# Patient Record
Sex: MALE | Race: BLACK OR AFRICAN AMERICAN | Employment: FULL TIME | URBAN - METROPOLITAN AREA
[De-identification: names, ages, dates, MRNs, and addresses within clinical notes are randomized per-mention and may not be internally consistent; named-entity substitution may affect disease eponyms.]

---

## 2019-08-21 ENCOUNTER — APPOINTMENT (OUTPATIENT)
Dept: GENERAL RADIOLOGY | Age: 34
End: 2019-08-21
Payer: MEDICARE

## 2019-08-21 ENCOUNTER — HOSPITAL ENCOUNTER (EMERGENCY)
Age: 34
Discharge: HOME | End: 2019-08-21
Payer: MEDICARE

## 2019-08-21 VITALS
BODY MASS INDEX: 27.49 KG/M2 | RESPIRATION RATE: 16 BRPM | TEMPERATURE: 98.4 F | HEIGHT: 66 IN | WEIGHT: 171.08 LBS | OXYGEN SATURATION: 98 % | HEART RATE: 94 BPM | SYSTOLIC BLOOD PRESSURE: 115 MMHG | DIASTOLIC BLOOD PRESSURE: 69 MMHG

## 2019-08-21 DIAGNOSIS — L98.9 LESION OF SKIN OF SCALP: ICD-10-CM

## 2019-08-21 DIAGNOSIS — M25.572 ACUTE LEFT ANKLE PAIN: Primary | ICD-10-CM

## 2019-08-21 PROCEDURE — 73610 X-RAY EXAM OF ANKLE: CPT

## 2019-08-21 PROCEDURE — 99283 EMERGENCY DEPT VISIT LOW MDM: CPT

## 2019-08-21 RX ORDER — CEPHALEXIN 500 MG/1
500 CAPSULE ORAL 4 TIMES DAILY
Qty: 40 CAPSULE | Refills: 0 | Status: SHIPPED | OUTPATIENT
Start: 2019-08-21 | End: 2021-04-26

## 2019-08-21 RX ORDER — IBUPROFEN 600 MG/1
600 TABLET ORAL EVERY 6 HOURS PRN
Qty: 20 TABLET | Refills: 0 | Status: SHIPPED | OUTPATIENT
Start: 2019-08-21 | End: 2022-05-02

## 2019-08-21 ASSESSMENT — PAIN DESCRIPTION - PROGRESSION: CLINICAL_PROGRESSION: GRADUALLY WORSENING

## 2019-08-21 ASSESSMENT — PAIN DESCRIPTION - DESCRIPTORS: DESCRIPTORS: ACHING

## 2019-08-21 ASSESSMENT — PAIN DESCRIPTION - LOCATION: LOCATION: HEAD;FOOT

## 2019-08-21 ASSESSMENT — PAIN DESCRIPTION - PAIN TYPE: TYPE: ACUTE PAIN

## 2019-08-21 ASSESSMENT — PAIN SCALES - GENERAL
PAINLEVEL_OUTOF10: 6
PAINLEVEL_OUTOF10: 6
PAINLEVEL_OUTOF10: 8

## 2019-08-21 ASSESSMENT — PAIN - FUNCTIONAL ASSESSMENT: PAIN_FUNCTIONAL_ASSESSMENT: PREVENTS OR INTERFERES WITH MANY ACTIVE NOT PASSIVE ACTIVITIES

## 2019-08-21 ASSESSMENT — PAIN DESCRIPTION - FREQUENCY: FREQUENCY: CONTINUOUS

## 2019-08-21 NOTE — ED NOTES
Dc'd to home  Awake alert  resp easy and unlabored  Walked out with Mom  To return for worsening or changes  Aware to wash hair and then apply the antibiotic ointment  Given  follow up to get a [address] [place] [address] Manuel Becker RN  08/21/19 8024

## 2019-08-21 NOTE — ED PROVIDER NOTES
[address] [address] [address] [facility] [address]  Dept: [phone]  Loc: [address] ENCOUNTER        This patient was not seen or evaluated by the attending physician. I evaluated this patient, the attending physician was available for consultation. CHIEF COMPLAINT    Chief Complaint   Patient presents with    Leg Pain     lower leg and in heel    Abscess     in head       HPI    Yoni Rodriguez is a 30 y.o. male who presents with pain localized in the left ankle. Onset was 1 month ago. The context was a spontaneous onset, patient cannot recall specific injury. The pain severity is 8/10. The patient has no other associated injuries. The pain is aggravated by ambulation. Patient also complains of rash of his scalp for the past 2 weeks. REVIEW OF SYSTEMS    General: No fever  Skin: see HPI, No lacerations or puncture wounds  Musculoskeletal: see HPI, no other joint pain    PAST MEDICAL & SURGICAL HISTORY    Past Medical History:   Diagnosis Date    Asthma      History reviewed. No pertinent surgical history.     CURRENT MEDICATIONS  (may include discharge medications prescribed in the ED)      ALLERGIES    Allergies   Allergen Reactions    Bactrim [Sulfamethoxazole-Trimethoprim] Nausea And Vomiting       SOCIAL & FAMILY HISTORY    Social History     Socioeconomic History    Marital status: Single     Spouse name: None    Number of children: None    Years of education: None    Highest education level: None   Occupational History    None   Social Needs    Financial resource strain: None    Food insecurity:     Worry: None     Inability: None    Transportation needs:     Medical: None     Non-medical: None   Tobacco Use    Smoking status: Current Every Day Smoker    Smokeless tobacco: Never Used   Substance and Sexual Activity    Alcohol use: None    Drug use: None    Sexual activity: None injury, tendon injury, other    No evidence of neurovascular injury on exam.  Plain films as above. Patient's scalp rash has the appearance of impetigo. I will prescribe Keflex and Bactroban. I instructed the patient to follow up as an outpatient in 2 days. I instructed the patient to return to the ED immediately for any new or worsening symptoms. The patient verbalizes understanding. FINAL IMPRESSION    1. Acute left ankle pain    2.  Lesion of skin of scalp        PLAN  Discharge with outpatient follow-up (see EMR)    (Please note that this note was completed with a voice recognition program.  Every attempt was made to edit the dictations, but inevitably there remain words that are mis-transcribed.)            Vanesa Almeida, [place]  08/21/19 8250

## 2021-04-26 ENCOUNTER — HOSPITAL ENCOUNTER (EMERGENCY)
Age: 36
Discharge: HOME | End: 2021-04-26

## 2021-04-26 VITALS
WEIGHT: 153 LBS | SYSTOLIC BLOOD PRESSURE: 122 MMHG | RESPIRATION RATE: 22 BRPM | DIASTOLIC BLOOD PRESSURE: 78 MMHG | BODY MASS INDEX: 24.69 KG/M2 | TEMPERATURE: 98.3 F | HEART RATE: 70 BPM | OXYGEN SATURATION: 97 %

## 2021-04-26 DIAGNOSIS — R21 RASH AND OTHER NONSPECIFIC SKIN ERUPTION: Primary | ICD-10-CM

## 2021-04-26 PROCEDURE — 99283 EMERGENCY DEPT VISIT LOW MDM: CPT

## 2021-04-26 RX ORDER — METHYLPREDNISOLONE 4 MG/1
TABLET ORAL
Qty: 1 KIT | Refills: 0 | Status: SHIPPED | OUTPATIENT
Start: 2021-04-26 | End: 2021-05-02

## 2021-04-26 RX ORDER — DIPHENHYDRAMINE HCL 25 MG
25 CAPSULE ORAL 3 TIMES DAILY PRN
Qty: 20 CAPSULE | Refills: 0 | Status: SHIPPED | OUTPATIENT
Start: 2021-04-26 | End: 2021-05-06

## 2021-04-26 RX ORDER — DIAPER,BRIEF,INFANT-TODD,DISP
EACH MISCELLANEOUS PRN
COMMUNITY
End: 2022-05-02

## 2021-04-26 RX ORDER — FAMOTIDINE 20 MG/1
20 TABLET, FILM COATED ORAL 2 TIMES DAILY
Qty: 28 TABLET | Refills: 0 | Status: SHIPPED | OUTPATIENT
Start: 2021-04-26 | End: 2022-05-02

## 2021-04-26 ASSESSMENT — ENCOUNTER SYMPTOMS
BACK PAIN: 0
DIARRHEA: 0
NAUSEA: 0
VOMITING: 0
EYE PAIN: 0
SHORTNESS OF BREATH: 0
ABDOMINAL PAIN: 0
COUGH: 0

## 2021-04-26 NOTE — ED NOTES
Pt /c home with AVS no s/s of distress noted script x 3 he denies questions about f/u care no ss of distress at d.c he denies pain      Christal Miranda RN  04/26/21 0931

## 2021-04-26 NOTE — ED NOTES
Report given to Chris Gongora RN at this time, all questions answered. Denies any further questions at this time.       Halina Delatorre RN  04/26/21 1568

## 2021-04-26 NOTE — ED PROVIDER NOTES
[address] [facility] ENCOUNTER        Pt Name: Kassidy Velázquez  MRN: [id number]  [place] 1985  Date of evaluation: 4/26/2021  Provider: LEXI Alcantara  PCP: No primary care provider on file. DEWAYNE. I have evaluated this patient. My supervising physician was available for consultation. CHIEF COMPLAINT       Chief Complaint   Patient presents with    Rash     bilateral forearm, back of head, left thigh. Patient denies new detergents, denies new soaps. Patient states he ate a piece of fish around 4 days ago, states his left arm started itching the next morning. hx of exzema, states this is different. HISTORY OF PRESENT ILLNESS   (Location, Timing/Onset, Context/Setting, Quality, Duration, Modifying Factors, Severity, Associated Signs and Symptoms)  Note limiting factors. Kassidy Velázquez is a 37 y.o. male who presents to the ED for evaluation of pruritic rash. She reports dry scaly skin with raised erythematous rash to bilateral arms, left side of the neck, left medial thigh. Onset over the past 4 days. He reports he does have a history of eczema that he uses hydrocortisone ointment for, but he does not feel that this has been helping with his symptoms. This feels much worse than previous eczema. He feels like this started after he ate fish from a restaurant 4 days ago. He denies swelling of the lips or the tongue. No difficulty breathing or swallowing. No nausea vomiting or near syncope. Has not tried any other medications. Skin feels very dry. No other acute concerns, associated symptoms or modifying factors. Nursing Notes were all reviewed and agreed with or any disagreements were addressed in the HPI. REVIEW OF SYSTEMS    (2-9 systems for level 4, 10 or more for level 5)     Review of Systems   Constitutional: Negative for chills, fatigue and fever. Eyes: Negative for pain.    Respiratory: Negative for cough and Eyes:      General:         Right eye: No discharge. Left eye: No discharge. Conjunctiva/sclera: Conjunctivae normal.   Neck:      Musculoskeletal: Normal range of motion and neck supple. Pulmonary:      Effort: Pulmonary effort is normal. No respiratory distress. Breath sounds: No stridor. Musculoskeletal: Normal range of motion. General: No swelling. Skin:     General: Skin is warm and dry. Coloration: Skin is not pale. Findings: Rash present. No abrasion, abscess, acne, bruising, burn, ecchymosis, signs of injury, laceration, lesion, petechiae or wound. Rash is papular, scaling and urticarial. Rash is not crusting, macular, nodular, purpuric, pustular or vesicular. Comments: With very scaly dry excoriated skin to bilateral forearms with slightly erythematous papular lesions scattered throughout. Lesions on the neck do appear slightly more urticarial on the left posterior aspect of the neck. Neurological:      Mental Status: He is alert and oriented to person, place, and time. Comments: No gross facial drooping. Moves all 4 extremities spontaneously. Psychiatric:         Behavior: Behavior normal.         DIAGNOSTIC RESULTS   LABS:    Labs Reviewed - No data to display    All other labs were within normal range or not returned as of this dictation. EKG: All EKG's are interpreted by the Emergency Department Physician in the absence of a cardiologist.  Please see their note for interpretation of EKG. RADIOLOGY:   Non-plain film images such as CT, Ultrasound and MRI are read by the radiologist. Plain radiographic images are visualized and preliminarily interpreted by the ED Provider with the below findings:        Interpretation per the Radiologist below, if available at the time of this note:    No orders to display     No results found.         PROCEDURES   Unless otherwise noted below, none     Procedures    CRITICAL CARE TIME N/A    CONSULTS:  None      EMERGENCY DEPARTMENT COURSE and DIFFERENTIAL DIAGNOSIS/MDM:   Vitals:    Vitals:    04/26/21 1728   BP: 122/78   Pulse: 70   Resp: 22   Temp: 98.3 °F (36.8 °C)   TempSrc: Oral   SpO2: 97%   Weight: 153 lb (69.4 kg)       Patient was given the following medications:  Medications - No data to display        DDX: Vasculitis, bacterial skin infection, viral rash, systemic infectious rash, Anaphylaxis, Urticaria. Patient seen and evaluated as detailed above for pruritic rash. Patient states his seem to worsen after a he ate fish from a restaurant patient is afebrile, in no acute distress, and nontoxic in appearance with unremarkable vital signs. No mucous membrane involvement with low suspicion for SJS/TEN. No wheezing or difficulty breathing with low suspicion for systemic involvement. It is very scaly, dry, with erythematous papular lesions on his arms, slightly more urticarial lesions on the neck. Will place on Medrol Dosepak, Benadryl, Pepcid, recommended Aquaphor for increased moisturizing as the patient has very dry skin. I wonder if this could be underlying eczema however will treat for possible allergic dermatitis with the aforementioned medications. Amended follow-up with PCP. At this time I believe patient's presentation does not warrant further workup with labs or imaging in the emergency department and is stable for discharge home. Discussed close monitoring for progression. Patient will be discharged with instructions to follow up with the primary care physician for reevaluation in the next few days, and to return to the emergency department for any worsening symptoms or further concerns. They verbalized understanding and were discharged in stable condition. David Gonzalez is well appearing, non-toxic, and afebrile at the time of discharge. FINAL IMPRESSION      1.  Rash and other nonspecific skin eruption          DISPOSITION/PLAN   DISPOSITION Decision To Discharge 04/26/2021 06:03:43 PM      PATIENT REFERREDTO:  Albino Mae  [phone]  Schedule an appointment as soon as possible for a visit   establish primary care with FlorSusan LE[place]ang [address] [address]  [phone]    If symptoms worsen      DISCHARGE MEDICATIONS:  New Prescriptions    DIPHENHYDRAMINE (BENADRYL) 25 MG CAPSULE    Take 1 capsule by mouth 3 times daily as needed for Itching    FAMOTIDINE (PEPCID) 20 MG TABLET    Take 1 tablet by mouth 2 times daily    METHYLPREDNISOLONE (MEDROL, BEVERLY,) 4 MG TABLET    Take by mouth. DISCONTINUED MEDICATIONS:  Discontinued Medications    CEPHALEXIN (KEFLEX) 500 MG CAPSULE    Take 1 capsule by mouth 4 times daily    MUPIROCIN (BACTROBAN) 2 % OINTMENT    Apply topically 2 times daily. 30 gram tube. Generic OK.               (Please note that portions of this note were completed with a voice recognition program.  Efforts were made to edit the dictations but occasionally words are mis-transcribed.)    LEXI Solo (electronically signed)            LEXI Solo  04/26/21 3121

## 2021-05-08 ENCOUNTER — APPOINTMENT (OUTPATIENT)
Dept: CT IMAGING | Age: 36
End: 2021-05-08

## 2021-05-08 ENCOUNTER — HOSPITAL ENCOUNTER (EMERGENCY)
Age: 36
Discharge: HOME | End: 2021-05-08
Attending: EMERGENCY MEDICINE

## 2021-05-08 VITALS
WEIGHT: 151.24 LBS | HEART RATE: 80 BPM | TEMPERATURE: 98.3 F | BODY MASS INDEX: 24.41 KG/M2 | SYSTOLIC BLOOD PRESSURE: 144 MMHG | DIASTOLIC BLOOD PRESSURE: 100 MMHG | RESPIRATION RATE: 16 BRPM | OXYGEN SATURATION: 100 %

## 2021-05-08 DIAGNOSIS — L30.9 DERMATITIS: ICD-10-CM

## 2021-05-08 DIAGNOSIS — S09.90XA INJURY OF HEAD, INITIAL ENCOUNTER: Primary | ICD-10-CM

## 2021-05-08 PROCEDURE — 99283 EMERGENCY DEPT VISIT LOW MDM: CPT

## 2021-05-08 PROCEDURE — 70450 CT HEAD/BRAIN W/O DYE: CPT

## 2021-05-08 RX ORDER — DIAPER,BRIEF,INFANT-TODD,DISP
EACH MISCELLANEOUS
Qty: 1 TUBE | Refills: 1 | Status: SHIPPED | OUTPATIENT
Start: 2021-05-08 | End: 2021-05-15

## 2021-05-08 RX ORDER — DIPHENHYDRAMINE HCL 25 MG
25 CAPSULE ORAL EVERY 4 HOURS PRN
Qty: 1 CAPSULE | Refills: 0 | Status: SHIPPED | OUTPATIENT
Start: 2021-05-08 | End: 2021-05-18

## 2021-05-08 ASSESSMENT — PAIN - FUNCTIONAL ASSESSMENT: PAIN_FUNCTIONAL_ASSESSMENT: 0-10

## 2021-05-08 ASSESSMENT — PAIN DESCRIPTION - LOCATION: LOCATION: NECK

## 2021-05-08 ASSESSMENT — PAIN DESCRIPTION - DESCRIPTORS: DESCRIPTORS: BURNING;ITCHING

## 2021-05-09 NOTE — ED NOTES
Return to room     Asael Early, [facility]  05/08/21 5577

## 2021-05-09 NOTE — ED NOTES
Patient remains in pain, states, \"this medicine will help\" EMD aware, reviewed instructions with patient, verb under, discharged home     Arturo Banks [facility]  05/08/21 5733

## 2021-05-09 NOTE — ED PROVIDER NOTES
sore throat. Rash to posterior left neck. Respiratory: No cough, No shortness of breath, No sputum production. Cardiovascular: No chest pain. No palpitations. Gastrointestinal: No abdominal pain. No nausea or vomiting  Hematology/Lymphatics: No bleeding or bruising tendency. Immunologic: No malaise. No swollen glands. Musculoskeletal: No back pain. No joint pain. Integumentary: No rash. No abrasions. Hematoma to top of head. Neurologic: No headache. No focal numbness or weakness. PAST MEDICAL HISTORY     Past Medical History:   Diagnosis Date    Asthma     Eczema          SURGICALHISTORY     History reviewed. No pertinent surgical history. CURRENT MEDICATIONS       Discharge Medication List as of 5/8/2021 10:42 PM      CONTINUE these medications which have NOT CHANGED    Details   !! hydrocortisone 1 % cream Apply topically as needed For eczema, Topical, PRN, Historical Med      famotidine (PEPCID) 20 MG tablet Take 1 tablet by mouth 2 times daily, Disp-28 tablet, R-0Print      ibuprofen (ADVIL;MOTRIN) 600 MG tablet Take 1 tablet by mouth every 6 hours as needed for Pain, Disp-20 tablet, R-0Print       !! - Potential duplicate medications found. Please discuss with provider. ALLERGIES     Bactrim [sulfamethoxazole-trimethoprim]    FAMILY HISTORY     History reviewed. No pertinent family history.        SOCIAL HISTORY       Social History     Socioeconomic History    Marital status: Single     Spouse name: None    Number of children: None    Years of education: None    Highest education level: None   Occupational History    None   Social Needs    Financial resource strain: None    Food insecurity     Worry: None     Inability: None    Transportation needs     Medical: None     Non-medical: None   Tobacco Use    Smoking status: Current Every Day Smoker     Packs/day: 2.00     Types: Cigars    Smokeless tobacco: Never Used    Tobacco comment: Black and mild FT   Substance and radiologist. [facility] radiographic images are visualized and preliminarily interpreted by the emergency physician with the below findings:      Interpretation per the Radiologist below, if available at the time ofthis note:    CT Head WO Contrast   Final Result   No acute intracranial abnormality. Specifically, no acute intracranial   hemorrhage or mass effect. Nonspecific bilateral periventricular and deep white matter ill-defined   hypoattenuation. This could be further evaluated with brain MRI as warranted. ED BEDSIDE ULTRASOUND:   Performed by ED Physician - none    LABS:  Labs Reviewed - No data to display        EMERGENCY DEPARTMENT COURSE and DIFFERENTIAL DIAGNOSIS/MDM:   Vitals:    Vitals:    05/08/21 2122   BP: (!) 144/100   Pulse: 80   Resp: 16   Temp: 98.3 °F (36.8 °C)   TempSrc: Oral   SpO2: 100%   Weight: 151 lb 3.8 oz (68.6 kg)         Medical decision making: This is a 82-year-old male who presents for evaluation of a head injury as well as a rash to the left posterior neck. On exam, patient is well-appearing, afebrile, with normal vital signs. He is noted to have a small hematoma and abrasion to the right parietal area of his scalp. Normal neurologic exam, no midline neck pain. There is a maculopapular rash to the left posterior neck. The neck rash appears like possibly eczema or some other dermatitis. He had a similar rash previously treated with steroids and Benadryl, therefore prescribed topical steroid and Benadryl as needed for itching. We did obtain CT imaging of the head which is negative for acute intracranial abnormality. There is nonspecific bilateral periventricular and deep white matter ill-defined hypoattenuation of which the patient is informed and recommended to follow-up closely with his primary care provider for outpatient brain MRI. Recommended patient return with any new or worsening symptoms, otherwise follow closely with PCP.   Patient is understanding and agreeable plan of care, discharged in stable condition. CRITICAL CARE TIME   Total Critical Care time was 0 minutes, excluding separately reportable procedures. There was a high probability of clinically significant/life threatening deterioration in the patient's condition which required my urgent intervention. CONSULTS:  None    PROCEDURES:  Unless otherwise noted below, none         FINAL IMPRESSION      1. Injury of head, initial encounter    2. Dermatitis          DISPOSITION/PLAN   DISPOSITION Decision To Discharge 05/08/2021 10:27:48 PM      PATIENT REFERRED TO:  Albino Mae  [phone]  Schedule an appointment as soon as possible for a visit in 2 days        DISCHARGE MEDICATIONS:  Discharge Medication List as of 5/8/2021 10:42 PM      START taking these medications    Details   !! hydrocortisone 1 % cream Apply topically 2 -3 times daily for 5 - 7 days. , Disp-1 Tube, R-1, Print      diphenhydrAMINE (BENADRYL) 25 MG capsule Take 1 capsule by mouth every 4 hours as needed for Itching, Disp-1 capsule, R-0Print       !! - Potential duplicate medications found. Please discuss with provider.              (Please note that portions of this note were completed with a voice recognition program.Efforts were made to edit the dictations but occasionally words are mis-transcribed.)    Karlo Dominguez MD (electronically signed)  Attending Emergency Physician        Karlo Dominguez MD  05/09/21 1186

## 2021-08-06 ENCOUNTER — HOSPITAL ENCOUNTER (EMERGENCY)
Age: 36
Discharge: HOME | End: 2021-08-06
Attending: EMERGENCY MEDICINE

## 2021-08-06 VITALS
HEART RATE: 93 BPM | WEIGHT: 170.42 LBS | SYSTOLIC BLOOD PRESSURE: 129 MMHG | OXYGEN SATURATION: 99 % | RESPIRATION RATE: 18 BRPM | HEIGHT: 66 IN | BODY MASS INDEX: 27.39 KG/M2 | TEMPERATURE: 97.5 F | DIASTOLIC BLOOD PRESSURE: 84 MMHG

## 2021-08-06 DIAGNOSIS — Z20.2 STD EXPOSURE: Primary | ICD-10-CM

## 2021-08-06 PROCEDURE — 99283 EMERGENCY DEPT VISIT LOW MDM: CPT

## 2021-08-06 PROCEDURE — 6370000000 HC RX 637 (ALT 250 FOR IP): Performed by: EMERGENCY MEDICINE

## 2021-08-06 RX ORDER — METRONIDAZOLE 500 MG/1
2000 TABLET ORAL ONCE
Status: COMPLETED | OUTPATIENT
Start: 2021-08-06 | End: 2021-08-06

## 2021-08-06 RX ADMIN — METRONIDAZOLE 2000 MG: 500 TABLET ORAL at 01:12

## 2021-08-06 ASSESSMENT — PAIN SCALES - GENERAL: PAINLEVEL_OUTOF10: 0

## 2021-08-06 NOTE — ED TRIAGE NOTES
Patient presents to ED complaining of exposure to STD. Denies symptoms, states he was told he was exposed to trichomonas. Patient resting on bed, respirations even and easy at this time. No obvious distress.

## 2021-08-06 NOTE — ED NOTES
Report given to [facility], RN at this time, all questions answered. Denies any further questions at this time. No further patient contact.        Ondina Damico RN  08/06/21 9181

## 2021-08-06 NOTE — ED PROVIDER NOTES
[address] [facility] ENCOUNTER      Pt Name: Armin Interiano  MRN: [id number]  [place] 1985  Date of evaluation: 8/6/2021  Provider: Lonny John MD    CHIEF COMPLAINT       Chief Complaint   Patient presents with    Exposure to STD     Denies symptoms, states he was told he was exposed to trichomonas. HISTORY OF PRESENT ILLNESS   (Location/Symptom, Timing/Onset,Context/Setting, Quality, Duration, Modifying Factors, Severity)  Note limiting factors. Armin Interiano is a 31 y.o. male who presents to the emergency department for STD treatment. The patient states that his significant other explained to him that she tested positive for trichomonas and he would like to be treated. He denies any symptoms. He refuses treatment for other common STDs. Nursing notes were reviewed. REVIEW OF SYSTEMS    (2-9 systems for level 4, 10 or more for level 5)     Review of Systems      PAST MEDICAL HISTORY     Past Medical History:   Diagnosis Date    Asthma     Eczema          SURGICALHISTORY     History reviewed. No pertinent surgical history. CURRENT MEDICATIONS       Discharge Medication List as of 8/6/2021  1:10 AM      CONTINUE these medications which have NOT CHANGED    Details   hydrocortisone 1 % cream Apply topically as needed For eczema, Topical, PRN, Historical Med      famotidine (PEPCID) 20 MG tablet Take 1 tablet by mouth 2 times daily, Disp-28 tablet, R-0Print      ibuprofen (ADVIL;MOTRIN) 600 MG tablet Take 1 tablet by mouth every 6 hours as needed for Pain, Disp-20 tablet, R-0Print             ALLERGIES     Bactrim [sulfamethoxazole-trimethoprim]    FAMILY HISTORY     History reviewed. No pertinent family history.        SOCIAL HISTORY       Social History     Socioeconomic History    Marital status: Single     Spouse name: None    Number of children: None    Years of education: None    Highest education level: None   Occupational History    None   Tobacco Use    Smoking status: Current Every Day Smoker     Packs/day: 2.00     Types: Cigars    Smokeless tobacco: Never Used    Tobacco comment: Black and mild FT   Vaping Use    Vaping Use: Never used   Substance and Sexual Activity    Alcohol use: Yes     Comment: occ    Drug use: Never    Sexual activity: None   Other Topics Concern    None   Social History Narrative    None     Social Determinants of Health     Financial Resource Strain:     Difficulty of Paying Living Expenses:    Food Insecurity:     Worried About Running Out of Food in the Last Year:     Ran Out of Food in the Last Year:    Transportation Needs:     Lack of Transportation (Medical):  Lack of Transportation (Non-Medical):    Physical Activity:     Days of Exercise per Week:     Minutes of Exercise per Session:    Stress:     Feeling of Stress :    Social Connections:     Frequency of Communication with Friends and Family:     Frequency of Social Gatherings with Friends and Family:     Attends Zoroastrianism Services:     Active Member of Clubs or Organizations:     Attends Club or Organization Meetings:     Marital Status:    Intimate Partner Violence:     Fear of Current or Ex-Partner:     Emotionally Abused:     Physically Abused:     Sexually Abused:        SCREENINGS             PHYSICAL EXAM    (up to 7 for level 4, 8 or more for level 5)     ED Triage Vitals [08/06/21 0016]   BP Temp Temp Source Pulse Resp SpO2 Height Weight   129/84 97.5 °F (36.4 °C) Oral 93 18 99 % 5' 6\" (1.676 m) 170 lb 6.7 oz (77.3 kg)       Physical Exam  Vitals and nursing note reviewed. Constitutional:       Appearance: Normal appearance. He is well-developed. He is not ill-appearing. HENT:      Head: Normocephalic and atraumatic. Right Ear: External ear normal.      Left Ear: External ear normal.      Nose: Nose normal.   Eyes:      General: No scleral icterus. Right eye: No discharge.          Left eye: No discharge. Conjunctiva/sclera: Conjunctivae normal.   Cardiovascular:      Rate and Rhythm: Normal rate. Pulmonary:      Effort: Pulmonary effort is normal. No respiratory distress. Musculoskeletal:      Cervical back: Neck supple. Skin:     Coloration: Skin is not pale. Neurological:      Mental Status: He is alert. Psychiatric:         Mood and Affect: Mood normal.         Behavior: Behavior normal.             DIAGNOSTIC RESULTS     EKG: All EKG's are interpreted by the Emergency Department Physician who either signs or Co-signs this chart in the absence of a cardiologist.    12 lead EKG shows     RADIOLOGY:   Non-plain film images such as CT, Ultrasound and MRI are read by the radiologist. Plain radiographic images are visualized and preliminarily interpreted by the emergency physician with the below findings:        Interpretation per the Radiologist below, if available at the time of this note:    No orders to display         ED BEDSIDE ULTRASOUND:   Performed by ED Physician - none    LABS:  Labs Reviewed - No data to display    All other labs were within normal range or not returned as of this dictation. EMERGENCY DEPARTMENT COURSE and DIFFERENTIAL DIAGNOSIS/MDM:   Vitals:    Vitals:    08/06/21 0016   BP: 129/84   Pulse: 93   Resp: 18   Temp: 97.5 °F (36.4 °C)   TempSrc: Oral   SpO2: 99%   Weight: 170 lb 6.7 oz (77.3 kg)   Height: 5' 6\" (1.676 m)       Adult male who comes in for STD exposure. The patient is treated with metronidazole. I strongly encourage follow-up with the health department. Safe sex practices are discussed. CRITICAL CARE TIME   None       CONSULTS:  None    PROCEDURES:       Procedures    FINAL IMPRESSION      1.  STD exposure          DISPOSITION/PLAN   DISPOSITION Decision To Discharge 08/06/2021 01:08:31 AM      PATIENT REFERREDTO:  [address] [facility]  [address] [place] [id number]  [phone]    Schedule an appointment as soon as possible for a visit         DISCHARGEMEDICATIONS:  Discharge Medication List as of 8/6/2021  1:10 AM             (Please note that portions of this note were completed with a voice recognition program.  Efforts were made to edit the dictations but occasionally words are mis-transcribed.)    Beth Fernandez MD (electronically signed)  Attending Emergency Physician        Beth Fernandez MD  08/06/21 9034

## 2022-02-02 ENCOUNTER — HOSPITAL ENCOUNTER (EMERGENCY)
Age: 37
Discharge: HOME | End: 2022-02-03
Attending: EMERGENCY MEDICINE

## 2022-02-02 VITALS
DIASTOLIC BLOOD PRESSURE: 85 MMHG | HEART RATE: 79 BPM | RESPIRATION RATE: 16 BRPM | OXYGEN SATURATION: 95 % | TEMPERATURE: 98.2 F | SYSTOLIC BLOOD PRESSURE: 122 MMHG

## 2022-02-02 DIAGNOSIS — L23.9 ALLERGIC DERMATITIS: Primary | ICD-10-CM

## 2022-02-02 PROCEDURE — 99283 EMERGENCY DEPT VISIT LOW MDM: CPT

## 2022-02-02 RX ORDER — METHYLPREDNISOLONE 4 MG/1
TABLET ORAL
Qty: 1 KIT | Refills: 0 | Status: SHIPPED | OUTPATIENT
Start: 2022-02-02 | End: 2022-05-02

## 2022-02-02 RX ORDER — DIPHENHYDRAMINE HCL 25 MG
25 CAPSULE ORAL EVERY 4 HOURS PRN
Qty: 30 CAPSULE | Refills: 0 | Status: SHIPPED | OUTPATIENT
Start: 2022-02-02 | End: 2022-02-12

## 2022-02-02 RX ORDER — CLINDAMYCIN HYDROCHLORIDE 300 MG/1
300 CAPSULE ORAL 3 TIMES DAILY
Qty: 30 CAPSULE | Refills: 0 | Status: SHIPPED | OUTPATIENT
Start: 2022-02-02 | End: 2022-02-12

## 2022-02-02 ASSESSMENT — PAIN DESCRIPTION - FREQUENCY: FREQUENCY: CONTINUOUS

## 2022-02-02 ASSESSMENT — PAIN DESCRIPTION - ONSET: ONSET: ON-GOING

## 2022-02-02 ASSESSMENT — PAIN DESCRIPTION - DESCRIPTORS: DESCRIPTORS: ITCHING

## 2022-02-02 ASSESSMENT — PAIN SCALES - GENERAL: PAINLEVEL_OUTOF10: 7

## 2022-02-02 ASSESSMENT — PAIN DESCRIPTION - LOCATION: LOCATION: FACE

## 2022-02-03 NOTE — ED PROVIDER NOTES
eMERGENCY dEPARTMENT eNCOUnter      Pt Name: Tho Weiss  MRN: [id number]  [place] 1985  Date of evaluation: 2/2/2022  Provider: Karely Jacobs MD     [address] [place] [address]       Chief Complaint   Patient presents with    Rash         HISTORY OF PRESENT ILLNESS   (Location/Symptom, Timing/Onset,Context/Setting, Quality, Duration, Modifying Factors, Severity) Note limiting factors. HPI    Tho Weiss is a 31 y.o. male who presents to the emergency department with a itchy rash on his face where the beer is located. Patient apparently about 3 to 4 days ago use a diet to get the gray out of his beer ever since that started burning sensation developed a rash there is some drainage. Patient states very pruritic or itchy. Patient states has been there for 3 days getting worse. Patient states that did use it as prescribed was a over-the-counter 3 and 1 by medication from a retail store. She has never used this before. Now developed an allergic reaction to it. Nursing Notes were reviewed. REVIEW OFSYSTEMS    (2+ for level 4; 10+ for level 5)   Review of Systems    General: No fevers, chills or night sweats, No weight loss    Head:  No Sore throat,  No Ear Pain    Chest:  Nontender. No Cough, No SOB,  Chest Pain    GI: No abdominal pain or vomiting    : No dysuria or hematuria    Musculoskeletal: No unrelenting pain or night pain    Neurologic: No bowel or bladder incontinence, No saddle anesthesia, No leg weakness    All other systems reviewed and are negative. PAST MEDICAL HISTORY     Past Medical History:   Diagnosis Date    Asthma     Eczema        SURGICAL HISTORY     No past surgical history on file.     CURRENT MEDICATIONS       Previous Medications    FAMOTIDINE (PEPCID) 20 MG TABLET    Take 1 tablet by mouth 2 times daily    HYDROCORTISONE 1 % CREAM    Apply topically as needed For eczema    IBUPROFEN (ADVIL;MOTRIN) 600 MG TABLET    Take 1 tablet by mouth every 6 hours as needed for Pain ALLERGIES     Bactrim [sulfamethoxazole-trimethoprim]    FAMILY HISTORY     No family history on file. SOCIAL HISTORY       Social History     Socioeconomic History    Marital status: Single     Spouse name: Not on file    Number of children: Not on file    Years of education: Not on file    Highest education level: Not on file   Occupational History    Not on file   Tobacco Use    Smoking status: Current Every Day Smoker     Packs/day: 2.00     Types: Cigars    Smokeless tobacco: Never Used    Tobacco comment: Black and mild FT   Vaping Use    Vaping Use: Never used   Substance and Sexual Activity    Alcohol use: Yes     Comment: occ    Drug use: Never    Sexual activity: Not on file   Other Topics Concern    Not on file   Social History Narrative    Not on file     Social Determinants of Health     Financial Resource Strain:     Difficulty of Paying Living Expenses: Not on file   Food Insecurity:     Worried About Running Out of Food in the Last Year: Not on file    Ronit of Food in the Last Year: Not on file   Transportation Needs:     Lack of Transportation (Medical): Not on file    Lack of Transportation (Non-Medical):  Not on file   Physical Activity:     Days of Exercise per Week: Not on file    Minutes of Exercise per Session: Not on file   Stress:     Feeling of Stress : Not on file   Social Connections:     Frequency of Communication with Friends and Family: Not on file    Frequency of Social Gatherings with Friends and Family: Not on file    Attends Orthodox Services: Not on file    Active Member of Clubs or Organizations: Not on file    Attends Club or Organization Meetings: Not on file    Marital Status: Not on file   Intimate Partner Violence:     Fear of Current or Ex-Partner: Not on file    Emotionally Abused: Not on file    Physically Abused: Not on file    Sexually Abused: Not on file   Housing Stability:     Unable to Pay for Housing in the Last Year: Not on file    Number of Places Lived in the Last Year: Not on file    Unstable Housing in the Last Year: Not on file       SCREENINGS           PHYSICAL EXAM    (up to 7 for level 4, 8 or more for level 5)     ED Triage Vitals   BP Temp Temp src Pulse Resp SpO2 Height Weight   -- -- -- -- -- -- -- --       Physical Exam    General: Alert and awake ×3. Nontoxic appearance. Well-developed well-nourished 70-year-old in no respiratory distress. Has allergic reaction to the dye he use his beard. HEENT: Normocephalic atraumatic. Neck is supple. Airway intact. No adenopathy  Cardiac: Regular rate and rhythm with no murmurs rubs or gallops  Pulmonary: Lungs are clear in all lung fields. No wheezing. No Rales. Abdomen: Soft and nontender. Negative hepatosplenomegaly. Bowel sounds are active  Extremities: Moving all extremities. No calf tenderness. Peripheral pulses all intact  Skin: Positive excoriated inflammation of the skin with some loss of hair from his beard. There is some drainage noted. Some areas is tender and nodular. Neurologic: Cranial nerves II through XII was grossly intact. Nonfocal neurological exam  Psychiatric: Patient is pleasant. Mood is appropriate. DIAGNOSTIC RESULTS     EKG (Per Emergency Physician):       RADIOLOGY (Per Emergency Physician): Interpretation per the Radiologist below, if available at the time of this note:  No results found. ED BEDSIDE ULTRASOUND:   Performed by ED Physician - none    LABS:  Labs Reviewed - No data to display     All other labs were within normal range or not returned as of this dictation. Procedures      EMERGENCY DEPARTMENT COURSE and DIFFERENTIAL DIAGNOSIS/MDM:   Vitals:    Vitals:    22 2330   BP: 122/85   Pulse: 79   Resp: 16   Temp: 98.2 °F (36.8 °C)   TempSrc: Oral   SpO2: 95%       Medications - No data to display    MDM.     Patient is a 70-year-old with allergic dermatitis on his beer after using a 3 and 1  product. Patient will be placed on Medrol Dosepak and antihistamine as well as antibiotic clindamycin. I suspect may be still got some infection as well. Patient discharged in good condition follow-up. Return if worse      REVAL:         CRITICAL CARE TIME   Total CriticalCare time was 0 minutes, excluding separately reportable procedures. There was a high probability of clinically significant/life threatening deterioration in the patient's condition which required my urgent intervention. CONSULTS:  None    PROCEDURES:  Unless otherwise noted below, none     [unfilled]    FINAL IMPRESSION      1. Allergic dermatitis          DISPOSITION/PLAN   DISPOSITION Decision To Discharge 02/02/2022 11:56:37 PM      PATIENT REFERRED TO:  Family physician    Schedule an appointment as soon as possible for a visit in 1 week  If symptoms worsen      DISCHARGE MEDICATIONS:  New Prescriptions    CLINDAMYCIN (CLEOCIN) 300 MG CAPSULE    Take 1 capsule by mouth 3 times daily for 10 days    DIPHENHYDRAMINE (BENADRYL) 25 MG CAPSULE    Take 1 capsule by mouth every 4 hours as needed for Itching    METHYLPREDNISOLONE (MEDROL, BEVERLY,) 4 MG TABLET    Take as directed          (Please note:  Portions of this note were completed with a voice recognition program.Efforts were made to edit the dictations but occasionally words and phrases are mis-transcribed.)  Form v2016. J.5-cn    Michael DOBSON MD (electronically signed)  Emergency Medicine Provider        Bandar Khan MD  02/03/22 0002

## 2022-02-03 NOTE — ED NOTES
Patient came in from home complaining of facial rash x3 days. States three days ago he used a hair coloring wash on his beard to try and get rid of greys and felt an itching. The itching has progressed to bumps with fluid leaking. States the rash is painful when touches. States he tried a benadryl around 2200 without relief. Only allergic to bactrim that he knows of. States the rash is only on his face. Denies any shortness of breath. A&O x4, vss.       Hasmukh Petersen RN  02/02/22 1957

## 2022-05-02 ENCOUNTER — HOSPITAL ENCOUNTER (EMERGENCY)
Age: 37
Discharge: HOME | End: 2022-05-02
Attending: EMERGENCY MEDICINE

## 2022-05-02 VITALS
RESPIRATION RATE: 17 BRPM | HEIGHT: 66 IN | OXYGEN SATURATION: 96 % | SYSTOLIC BLOOD PRESSURE: 119 MMHG | DIASTOLIC BLOOD PRESSURE: 76 MMHG | WEIGHT: 166.23 LBS | TEMPERATURE: 98.3 F | BODY MASS INDEX: 26.71 KG/M2 | HEART RATE: 77 BPM

## 2022-05-02 DIAGNOSIS — L30.9 DERMATITIS: Primary | ICD-10-CM

## 2022-05-02 PROCEDURE — 99283 EMERGENCY DEPT VISIT LOW MDM: CPT

## 2022-05-02 RX ORDER — DIPHENHYDRAMINE HCL 25 MG
25 CAPSULE ORAL EVERY 6 HOURS PRN
COMMUNITY
End: 2022-07-13 | Stop reason: ALTCHOICE

## 2022-05-02 RX ORDER — CLINDAMYCIN HYDROCHLORIDE 300 MG/1
300 CAPSULE ORAL 3 TIMES DAILY
Qty: 30 CAPSULE | Refills: 0 | Status: SHIPPED | OUTPATIENT
Start: 2022-05-02 | End: 2022-05-12

## 2022-05-02 RX ORDER — METHYLPREDNISOLONE 4 MG/1
TABLET ORAL
Qty: 1 KIT | Refills: 0 | Status: SHIPPED | OUTPATIENT
Start: 2022-05-02 | End: 2022-09-21

## 2022-05-02 ASSESSMENT — PAIN - FUNCTIONAL ASSESSMENT
PAIN_FUNCTIONAL_ASSESSMENT: NONE - DENIES PAIN
PAIN_FUNCTIONAL_ASSESSMENT: NONE - DENIES PAIN

## 2022-05-02 NOTE — ED PROVIDER NOTES
eMERGENCY dEPARTMENT eNCOUnter      Pt Name: Alhaji Hoffman  MRN: [id number]  [place] 1985  Date of evaluation: 5/2/2022  Provider: Mike Banks MD     [address] [place] [address]       Chief Complaint   Patient presents with    Rash     rash x 1 day with drainage after dying his beard 2 days ago. Pt denies any pain, positive for irritation. Has had this issue in the past         HISTORY OF PRESENT ILLNESS   (Location/Symptom, Timing/Onset,Context/Setting, Quality, Duration, Modifying Factors, Severity) Note limiting factors. HPI    Alhaji Hoffman is a 31 y.o. male who presents to the emergency department with a rash on his beer after dying it 2 days ago. It is draining. Has history of this before. It swelling and itchy. No fevers no chills. Does not remember applying the material.  He states he used a new one and still causing the same issues. Nursing Notes were reviewed. REVIEW OFSYSTEMS    (2+ for level 4; 10+ for level 5)   Review of Systems    General: No fevers, chills or night sweats, No weight loss    Head:  No Sore throat,  No Ear Pain    Chest:  Nontender. No Cough, No SOB,  Chest Pain    GI: No abdominal pain or vomiting    : No dysuria or hematuria    Musculoskeletal: No unrelenting pain or night pain    Neurologic: No bowel or bladder incontinence, No saddle anesthesia, No leg weakness    All other systems reviewed and are negative. PAST MEDICAL HISTORY     Past Medical History:   Diagnosis Date    Asthma     Eczema        SURGICAL HISTORY     History reviewed. No pertinent surgical history. CURRENT MEDICATIONS       Discharge Medication List as of 5/2/2022 12:29 AM      CONTINUE these medications which have NOT CHANGED    Details   diphenhydrAMINE (BENADRYL) 25 MG capsule Take 25 mg by mouth every 6 hours as needed for ItchingHistorical Med             ALLERGIES     Bactrim [sulfamethoxazole-trimethoprim]    FAMILY HISTORY     History reviewed. No pertinent family history. SOCIAL HISTORY       Social History     Socioeconomic History    Marital status: Single     Spouse name: None    Number of children: None    Years of education: None    Highest education level: None   Occupational History    None   Tobacco Use    Smoking status: Current Every Day Smoker     Packs/day: 2.00     Types: Cigars    Smokeless tobacco: Never Used    Tobacco comment: Black and mild FT   Vaping Use    Vaping Use: Never used   Substance and Sexual Activity    Alcohol use: Not Currently     Comment: occ    Drug use: Yes     Types: Marijuana Leodan Bilberry)    Sexual activity: None   Other Topics Concern    None   Social History Narrative    None     Social Determinants of Health     Financial Resource Strain:     Difficulty of Paying Living Expenses: Not on file   Food Insecurity:     Worried About Running Out of Food in the Last Year: Not on file    Ronit of Food in the Last Year: Not on file   Transportation Needs:     Lack of Transportation (Medical): Not on file    Lack of Transportation (Non-Medical):  Not on file   Physical Activity:     Days of Exercise per Week: Not on file    Minutes of Exercise per Session: Not on file   Stress:     Feeling of Stress : Not on file   Social Connections:     Frequency of Communication with Friends and Family: Not on file    Frequency of Social Gatherings with Friends and Family: Not on file    Attends Gnosticism Services: Not on file    Active Member of [address] [place] [address] [employer] or Organizations: Not on file    Attends Club or Organization Meetings: Not on file    Marital Status: Not on file   Intimate Partner Violence:     Fear of Current or Ex-Partner: Not on file    Emotionally Abused: Not on file    Physically Abused: Not on file    Sexually Abused: Not on file   Housing Stability:     Unable to Pay for Housing in the Last Year: Not on file    Number of [place] in the Last Year: Not on file    Unstable Housing in the Last Year: Not on file SCREENINGS    Joyce Coma Scale  Eye Opening: Spontaneous  Best Verbal Response: Oriented  Best Motor Response: Obeys commands  Joyce Coma Scale Score: 15      PHYSICAL EXAM    (up to 7 for level 4, 8 or more for level 5)     ED Triage Vitals   BP Temp Temp src Pulse Resp SpO2 Height Weight   -- -- -- -- -- -- -- --       Physical Exam    General: Alert and awake ×3. Nontoxic appearance. Well-developed well-nourished 62-year-old black male no distress. Patient has a beard and mustache. Those area has been irritated inflamed and has a allergic dermatitis rash with some drainage noted. Is slightly red no signs of cellulitis. HEENT: Normocephalic atraumatic. Neck is supple. Airway intact. No adenopathy  Cardiac: Regular rate and rhythm with no murmurs rubs or gallops  Pulmonary: Lungs are clear in all lung fields. No wheezing. No Rales. Abdomen: Soft and nontender. Negative hepatosplenomegaly. Bowel sounds are active  Extremities: Moving all extremities. No calf tenderness. Peripheral pulses all intact  Skin: No skin lesions. No rashes  Neurologic: Cranial nerves II through XII was grossly intact. Nonfocal neurological exam  Psychiatric: Patient is pleasant. Mood is appropriate. DIAGNOSTIC RESULTS     EKG (Per Emergency Physician):       RADIOLOGY (Per Emergency Physician): Interpretation per the Radiologist below, if available at the time of this note:  No results found. ED BEDSIDE ULTRASOUND:   Performed by ED Physician - none    LABS:  Labs Reviewed - No data to display     All other labs were within normal range or not returned as of this dictation. Procedures      EMERGENCY DEPARTMENT COURSE and DIFFERENTIAL DIAGNOSIS/MDM:   Vitals:    Vitals:    05/02/22 0022   BP: 119/76   Pulse: 77   Resp: 17   Temp: 98.3 °F (36.8 °C)   TempSrc: Oral   SpO2: 96%   Weight: 166 lb 3.6 oz (75.4 kg)   Height: 5' 6\" (1.676 m)       Medications - No data to display    MDM.     Patient presents with a rash after dying his mustache and beard. Started reacting to it. Now it is draining. Will be placed on a Medrol Dosepak and clindamycin. I suspect a early infection. Patient was told to stop using any type of diet material.  Follow-up. REVAL:         CRITICAL CARE TIME   Total CriticalCare time was 0 minutes, excluding separately reportable procedures. There was a high probability of clinically significant/life threatening deterioration in the patient's condition which required my urgent intervention. CONSULTS:  None    PROCEDURES:  Unless otherwise noted below, none     [unfilled]    FINAL IMPRESSION      1. Dermatitis          DISPOSITION/PLAN   DISPOSITION Decision To Discharge 05/02/2022 12:25:48 AM      PATIENT REFERRED TO:  Family physician    Schedule an appointment as soon as possible for a visit in 1 week  If symptoms worsen      DISCHARGE MEDICATIONS:  Discharge Medication List as of 5/2/2022 12:29 AM      START taking these medications    Details   clindamycin (CLEOCIN) 300 MG capsule Take 1 capsule by mouth 3 times daily for 10 days, Disp-30 capsule, R-0Normal                (Please note:  Portions of this note were completed with a voice recognition program.Efforts were made to edit the dictations but occasionally words and phrases are mis-transcribed.)  Form v2016. J.5-cn    Mariaelena DOBSON MD (electronically signed)  Emergency Medicine Provider        Gregg Ocampo MD  05/02/22 0176

## 2022-07-13 ENCOUNTER — HOSPITAL ENCOUNTER (EMERGENCY)
Age: 37
Discharge: HOME | End: 2022-07-13
Attending: STUDENT IN AN ORGANIZED HEALTH CARE EDUCATION/TRAINING PROGRAM

## 2022-07-13 VITALS
OXYGEN SATURATION: 100 % | DIASTOLIC BLOOD PRESSURE: 78 MMHG | HEART RATE: 66 BPM | TEMPERATURE: 97.6 F | SYSTOLIC BLOOD PRESSURE: 113 MMHG | RESPIRATION RATE: 18 BRPM

## 2022-07-13 DIAGNOSIS — L24.0 CONTACT DERMATITIS DUE TO DETERGENT, UNSPECIFIED CONTACT DERMATITIS TYPE: Primary | ICD-10-CM

## 2022-07-13 PROCEDURE — 99283 EMERGENCY DEPT VISIT LOW MDM: CPT

## 2022-07-13 RX ORDER — DIPHENHYDRAMINE HCL 25 MG
25 CAPSULE ORAL EVERY 4 HOURS PRN
Qty: 30 CAPSULE | Refills: 0 | Status: SHIPPED | OUTPATIENT
Start: 2022-07-13 | End: 2022-07-18

## 2022-07-13 RX ORDER — DIAPER,BRIEF,INFANT-TODD,DISP
EACH MISCELLANEOUS 2 TIMES DAILY
Status: DISCONTINUED | OUTPATIENT
Start: 2022-07-13 | End: 2022-07-13 | Stop reason: HOSPADM

## 2022-07-13 ASSESSMENT — PAIN - FUNCTIONAL ASSESSMENT
PAIN_FUNCTIONAL_ASSESSMENT: NONE - DENIES PAIN
PAIN_FUNCTIONAL_ASSESSMENT: NONE - DENIES PAIN

## 2022-07-13 NOTE — ED PROVIDER NOTES
[id number] [facility]      CHIEF COMPLAINT   Rash    HISTORY OF PRESENT ILLNESS  Deana Perez is a 30 y.o. male with a past medical history of asthma and eczema, who presents to the ED complaining of rash. Noticed symptoms for 3d. Reports rash to bilateral hands. Itchy. Did use generic bleach (bought at grocery store) prior to onset of symptoms. Did not wear gloves. No burning or pain. Does report new soap. Denies any other new exposure. Denies new medications. Red Flags:  Fever: denies  Toxic appearance: no  Hypotension: no   Mucosal lesions: denies   Severe pain: no   Very old or young age: no   Immunosuppressed: denies  New Medication: denies    Old records reviewed: Patient was seen for the same complaint in May and February    No other complaints, modifying factors or associated symptoms. I have reviewed the following from the nursing documentation. Past Medical History:   Diagnosis Date    Asthma     Eczema      History reviewed. No pertinent surgical history. History reviewed. No pertinent family history.   Social History     Socioeconomic History    Marital status: Single     Spouse name: Not on file    Number of children: Not on file    Years of education: Not on file    Highest education level: Not on file   Occupational History    Not on file   Tobacco Use    Smoking status: Current Every Day Smoker     Packs/day: 2.00     Types: Cigars    Smokeless tobacco: Never Used    Tobacco comment: Black and mild FT   Vaping Use    Vaping Use: Never used   Substance and Sexual Activity    Alcohol use: Not Currently     Comment: occ    Drug use: Yes     Types: Marijuana Maurita Handsome)    Sexual activity: Not on file   Other Topics Concern    Not on file   Social History Narrative    Not on file     Social Determinants of Health     Financial Resource Strain:     Difficulty of Paying Living Expenses: Not on file   Food Insecurity:     Worried About Running Out of Food in the Last Year: Not on file    Ran Out of Food in the Last Year: Not on file   Transportation Needs:     Lack of Transportation (Medical): Not on file    Lack of Transportation (Non-Medical): Not on file   Physical Activity:     Days of Exercise per Week: Not on file    Minutes of Exercise per Session: Not on file   Stress:     Feeling of Stress : Not on file   Social Connections:     Frequency of Communication with Friends and Family: Not on file    Frequency of Social Gatherings with Friends and Family: Not on file    Attends [employer] Services: Not on file    Active Member of [address] [place] [address] [employer] or Organizations: Not on file    Attends Club or Organization Meetings: Not on file    Marital Status: Not on file   Intimate Partner Violence:     Fear of Current or Ex-Partner: Not on file    Emotionally Abused: Not on file    Physically Abused: Not on file    Sexually Abused: Not on file   Housing Stability:     Unable to Pay for Housing in the Last Year: Not on file    Number of [place] in the Last Year: Not on file    Unstable Housing in the Last Year: Not on file     No current facility-administered medications for this encounter. Current Outpatient Medications   Medication Sig Dispense Refill    diphenhydrAMINE (BENADRYL) 25 MG capsule Take 1 capsule by mouth every 4 hours as needed for Itching or Allergies 30 capsule 0    hydrocortisone 2.5 % cream Apply topically 2 times daily 20 g 0    methylPREDNISolone (MEDROL, BEVERLY,) 4 MG tablet Take as directed 1 kit 0     Allergies   Allergen Reactions    Bactrim [Sulfamethoxazole-Trimethoprim] Nausea And Vomiting       REVIEW OF SYSTEMS  All systems reviewed, pertinent positives per HPI otherwise noted to be negative. PHYSICAL EXAM  /78   Pulse 66   Temp 97.6 °F (36.4 °C) (Oral)   Resp 18   SpO2 100%    GENERAL APPEARANCE: Awake and alert. Cooperative. no distress. HENT: Normocephalic. Atraumatic.  Mucous membranes are moist, no lesions in the mouth  NECK: Supple. Full range of motion of the neck without stiffness or pain. EYES: PERRL. EOM's grossly intact. HEART/CHEST: RRR. No murmurs. Chest wall is not tender to palpation. LUNGS: Respirations unlabored. CTAB. Good air exchange. Speaking comfortably in full sentences. ABDOMEN: No tenderness. Soft. Non-distended. No masses. No organomegaly. No guarding or rebound. MUSCULOSKELETAL: No extremity edema. Compartments soft. No deformity. No tenderness in the extremities. All extremities neurovascularly intact. SKIN: Warm and dry. Raised lesions on the back of the hands. Nikolsky sign negative  NEUROLOGICAL: Alert and oriented. CN's 2-12 intact. No gross facial drooping. Strength 5/5, sensation intact. PSYCHIATRIC: Normal mood and affect. LABS  I have reviewed all labs for this visit. No results found for this visit on 07/13/22. RADIOLOGY    No orders to display          ED COURSE / MDM  Patient seen and evaluated. Old records reviewed and pertinent information included in HPI. Labs and imaging reviewed and results discussed with patient. Overall well appearing patient, in no acute distress, presenting for rash using bleach. He denies any pain. Physical exam remarkable for raised lesions on the bilateral posterior hands. Differential diagnosis includes but is not limited to: contact dermatitis, atopic dermatitis, urticaria, viral exanthem, cellulitis, low suspicion for SJS, TEN, dress syndrome, shingles, pemphigus vulgaris, bullous pemphigoid, ITP, staph scalded skin syndrome, low suspicion for chemical burn    During the patient's ED course, the patient was given:  Medications - No data to display     Patient reports that symptoms started after using a over-the-counter bleach solution, however he denies any pain. Distribution not consistent with burn given there is nothing on the palmar surfaces of his hands.   Patient has been seen for dermatitis in the past.  Overall very low suspicion for chronic wound at this time based off history and exam.    Patient denies any new medications. Nikolsky negative. Low suspicion for SJS, TENS, dress syndrome. Rash is not consistent with petechiae. Rash is not in a dermatomal distribution, low suspicion for shingles. Rash is not on the face, no rash in the ear canals or on the nose, low suspicion for Arie Hunt syndrome. This is a pleasant patient with a rash of uncertain etiology. Patient is afebrile, in NAD and nontoxic in appearance. There is no petechiae or purpura. There is no angioedema or respiratory symptoms. It was explained to the pt/family that the definite etiology has not been identified. There is no significant evidence of an allergic reaction or anaphylaxis, sepsis, meningitis or meningococcemia, vasculitis, TSS, SJS, necrotizing fasciitis, or other process requiring immediate medical or surgical intervention. Rash associated with an autoimmune disorder as not been ruled out. Pt was counseled to return to the ED if they develop fever, swelling, shortness of breath, or otherwise worsen. They were counseled on close follow-up with their PMD and specifically with a dermatologist if the symptoms persist.      Work-up overall reassuring. At this time, feel the patient is appropriate for discharge to follow-up with a primary care doctor. Patient feels comfortable with discharge at this time. Patient was provided with prescriptions for Benadryl and hydrocortisone cream.  Dermatology referral can. Return precautions given. Encouraged PCP follow-up in 2 days. Patient discharged in stable condition. CLINICAL IMPRESSION  1. Contact dermatitis due to detergent, unspecified contact dermatitis type        Blood pressure 113/78, pulse 66, temperature 97.6 °F (36.4 °C), temperature source Oral, resp. rate 18, SpO2 100 %. DISPOSITION  Bernardino Mcghee was discharged to home in stable condition.     Is this patient to be included in the SEP-1 Core Measure due to severe sepsis or septic shock? No   Exclusion criteria - the patient is NOT to be included for SEP-1 Core Measure due to:  2+ SIRS criteria are not met\      Patient was given scripts for the following medications. I counseled patient how to take these medications. Discharge Medication List as of 7/13/2022  2:04 AM      START taking these medications    Details   diphenhydrAMINE (BENADRYL) 25 MG capsule Take 1 capsule by mouth every 4 hours as needed for Itching or Allergies, Disp-30 capsule, R-0Normal      hydrocortisone 2.5 % cream Apply topically 2 times daily, Disp-20 g, R-0, Normal             Follow-up with:  [facility]) Pre-Services  [phone]  Schedule an appointment as soon as possible for a visit       [address] [place] [address] [address] [facility] [address] [place] [address]    Schedule an appointment as soon as possible for a visit       [facility]  [place] [facility]  [phone]  Go to   As needed, If symptoms worsen      DISCLAIMER: This chart was created using Dragon dictation software. Efforts were made by me to ensure accuracy, however some errors may be present due to limitations of this technology and occasionally words are not transcribed correctly.           Cindy Garay MD  07/14/22 0666

## 2022-09-21 ENCOUNTER — HOSPITAL ENCOUNTER (EMERGENCY)
Age: 37
Discharge: HOME | End: 2022-09-22
Attending: EMERGENCY MEDICINE

## 2022-09-21 VITALS
DIASTOLIC BLOOD PRESSURE: 70 MMHG | HEART RATE: 76 BPM | TEMPERATURE: 97.7 F | WEIGHT: 151.46 LBS | BODY MASS INDEX: 23.77 KG/M2 | SYSTOLIC BLOOD PRESSURE: 107 MMHG | HEIGHT: 67 IN | RESPIRATION RATE: 16 BRPM | OXYGEN SATURATION: 98 %

## 2022-09-21 DIAGNOSIS — L25.9 CONTACT DERMATITIS, UNSPECIFIED CONTACT DERMATITIS TYPE, UNSPECIFIED TRIGGER: Primary | ICD-10-CM

## 2022-09-21 PROCEDURE — 99283 EMERGENCY DEPT VISIT LOW MDM: CPT

## 2022-09-21 RX ORDER — DIPHENHYDRAMINE HCL 25 MG
25 CAPSULE ORAL EVERY 4 HOURS PRN
Qty: 20 CAPSULE | Refills: 1 | Status: SHIPPED | OUTPATIENT
Start: 2022-09-21 | End: 2022-10-01

## 2022-09-21 ASSESSMENT — PAIN - FUNCTIONAL ASSESSMENT: PAIN_FUNCTIONAL_ASSESSMENT: NONE - DENIES PAIN

## 2022-09-22 ASSESSMENT — ENCOUNTER SYMPTOMS
SHORTNESS OF BREATH: 0
VOICE CHANGE: 0
TROUBLE SWALLOWING: 0
WHEEZING: 0

## 2022-09-22 ASSESSMENT — PAIN - FUNCTIONAL ASSESSMENT: PAIN_FUNCTIONAL_ASSESSMENT: NONE - DENIES PAIN

## 2022-09-22 NOTE — ED NOTES
Discharge and education instructions reviewed. Patient verbalized understanding, teach-back successful. Patient denied questions at this time. No acute distress noted. Patient instructed to follow-up as noted - return to emergency department if symptoms worsen. Patient verbalized understanding. Discharged per EDMD with discharge instructions.           Stephanieraphael DempseyHaven [facility]  09/22/22 5810

## 2022-09-22 NOTE — ED PROVIDER NOTES
reviewed. No pertinent surgical history. CURRENT MEDICATIONS       Previous Medications    No medications on file       ALLERGIES     Bactrim [sulfamethoxazole-trimethoprim]    FAMILY HISTORY     History reviewed. No pertinent family history. SOCIAL HISTORY       Social History     Socioeconomic History    Marital status: Single     Spouse name: None    Number of children: None    Years of education: None    Highest education level: None   Tobacco Use    Smoking status: Every Day     Packs/day: 2.00     Types: Cigars, Cigarettes    Smokeless tobacco: Never    Tobacco comments:     Black and mild FT   Vaping Use    Vaping Use: Never used   Substance and Sexual Activity    Alcohol use: Not Currently     Comment: occ    Drug use: Yes     Types: Marijuana (Weed)         PHYSICAL EXAM    (up to 7 for level 4, 8 or more for level 5)     ED Triage Vitals [09/21/22 2345]   BP Temp Temp Source Heart Rate Resp SpO2 Height Weight   107/70 97.7 °F (36.5 °C) Oral 76 16 98 % 5' 6.5\" (1.689 m) 151 lb 7.3 oz (68.7 kg)       Physical Exam  Vitals and nursing note reviewed. Constitutional:       General: He is not in acute distress. Appearance: He is well-developed. HENT:      Head: Normocephalic and atraumatic. Eyes:      Conjunctiva/sclera: Conjunctivae normal.   Neck:      Vascular: No JVD. Trachea: No tracheal deviation. Cardiovascular:      Rate and Rhythm: Normal rate. Pulmonary:      Effort: Pulmonary effort is normal. No respiratory distress. Skin:     General: Skin is warm and dry. Comments: Erythematous plaque to left neck with mild excoriation. No vesicles. No mucous membrane involvement. Neurological:      Mental Status: He is alert.          EMERGENCY DEPARTMENT COURSE and DIFFERENTIAL DIAGNOSIS/MDM:   Vitals:    Vitals:    09/21/22 2345   BP: 107/70   Pulse: 76   Resp: 16   Temp: 97.7 °F (36.5 °C)   TempSrc: Oral   SpO2: 98%   Weight: 151 lb 7.3 oz (68.7 kg)   Height: 5' 6.5\" (1.689 m)         MDM  All vital signs within normal limits. Suspect contact dermatitis and feel patient is appropriate for topical corticosteroids, p.o. Benadryl, and primary care follow-up. Standard ER return precautions given for any fever difficulty breathing or difficulty swallowing. Differential considered and deemed less likely based on history and physical exam include bacterial infection (erysipelas, impetigo, cellulitis), viral (roseola, pityriasis rosea, herpetic rash), inflammatory/autoimmune (TEN, SJS, EM), scabies and allergic dermatitis. We have discussed the diagnosis and risks, and we agree with discharging home to follow-up with their primary doctor. We also discussed returning to the Emergency Department immediately if new or worsening symptoms occur. We have discussed the symptoms which are most concerning (e.g.,difficulty breathing, trouble swallowing, fevers) that necessitate immediate return. Procedures    FINAL IMPRESSION      1. Contact dermatitis, unspecified contact dermatitis type, unspecified trigger          DISPOSITION/PLAN   DISPOSITION Decision To Discharge 09/21/2022 11:57:36 PM      PATIENT REFERRED TO:  [facility]  [phone]  In 1 week  Ask for an appointment with a primary care doctor    [place] [address]  [phone]    If symptoms worsen      MEDICATIONS:  New Prescriptions    DIPHENHYDRAMINE (BENADRYL) 25 MG CAPSULE    Take 1 capsule by mouth every 4 hours as needed for Itching          (Please note that portions of this note were completed with a voice recognition program.  Efforts were made to edit the dictations but occasionally words aremis-transcribed. )    Lawrence Silverio MD (electronically signed)  Attending Emergency Physician           Lawrence Silverio MD  09/22/22 8425

## 2023-01-20 ENCOUNTER — HOSPITAL ENCOUNTER (EMERGENCY)
Age: 38
Discharge: HOME | End: 2023-01-20
Attending: EMERGENCY MEDICINE

## 2023-01-20 VITALS
DIASTOLIC BLOOD PRESSURE: 73 MMHG | WEIGHT: 153 LBS | HEART RATE: 71 BPM | SYSTOLIC BLOOD PRESSURE: 127 MMHG | HEIGHT: 66 IN | TEMPERATURE: 98.3 F | OXYGEN SATURATION: 100 % | RESPIRATION RATE: 16 BRPM | BODY MASS INDEX: 24.59 KG/M2

## 2023-01-20 DIAGNOSIS — R21 RASH AND OTHER NONSPECIFIC SKIN ERUPTION: Primary | ICD-10-CM

## 2023-01-20 PROCEDURE — 99283 EMERGENCY DEPT VISIT LOW MDM: CPT

## 2023-01-20 RX ORDER — PREDNISONE 10 MG/1
TABLET ORAL
Qty: 30 TABLET | Refills: 0 | Status: SHIPPED | OUTPATIENT
Start: 2023-01-20

## 2023-01-20 ASSESSMENT — PAIN - FUNCTIONAL ASSESSMENT: PAIN_FUNCTIONAL_ASSESSMENT: 0-10

## 2023-01-20 ASSESSMENT — PAIN SCALES - GENERAL: PAINLEVEL_OUTOF10: 5

## 2023-01-20 NOTE — ED PROVIDER NOTES
[address] [facility]      CHIEF COMPLAINT  Rash (States he started 3 days ago with rash on neck. C/o itching. Using petroleum ointment without relief.)       HISTORY OF PRESENT ILLNESS  Elana Caldwell is a 30 y.o. male  who presents to the ED complaining of rash to the back of his neck. It started several days ago and is itchy but hurts when he tries to itch it. He had a symptom with petroleum jelly that he had applied to the area and that seemed to precede the symptoms. He denies any other new exposures or new soaps or lotions. Patient denies any shortness of breath. No known fevers. No known sick contacts. No other complaints, modifying factors or associated symptoms. I have reviewed the following from the nursing documentation. Past Medical History:   Diagnosis Date    Asthma     Eczema      History reviewed. No pertinent surgical history. History reviewed. No pertinent family history.   Social History     Socioeconomic History    Marital status: Single     Spouse name: Not on file    Number of children: Not on file    Years of education: Not on file    Highest education level: Not on file   Occupational History    Not on file   Tobacco Use    Smoking status: Every Day     Packs/day: 2.00     Types: Cigars, Cigarettes    Smokeless tobacco: Never    Tobacco comments:     Black and mild FT   Vaping Use    Vaping Use: Never used   Substance and Sexual Activity    Alcohol use: Not Currently     Comment: occ    Drug use: Yes     Types: Marijuana Rodena Capes)    Sexual activity: Not on file   Other Topics Concern    Not on file   Social History Narrative    Not on file     Social Determinants of Health     Financial Resource Strain: Not on file   Food Insecurity: Not on file   Transportation Needs: Not on file   Physical Activity: Not on file   Stress: Not on file   Social Connections: Not on file   Intimate Partner Violence: Not on file   Housing Stability: Not on file     No current facility-administered medications for this encounter. Current Outpatient Medications   Medication Sig Dispense Refill    predniSONE (DELTASONE) 10 MG tablet Take 4 tablets daily x3 days by mouth, followed by 3 tablets daily x3 days, 2 tablets daily x3 days, and 1 tablet daily until gone 30 tablet 0    hydrocortisone 2.5 % cream Apply topically 2 times daily 30 g 1     Allergies   Allergen Reactions    Bactrim [Sulfamethoxazole-Trimethoprim] Nausea And Vomiting       PHYSICAL EXAM  /73   Pulse 71   Temp 98.3 °F (36.8 °C) (Oral)   Resp 16   Ht 5' 5.5\" (1.664 m)   Wt 153 lb (69.4 kg)   SpO2 100%   BMI 25.07 kg/m²    GENERAL APPEARANCE: Awake and alert. Cooperative. No acute distress. HENT: Normocephalic. Atraumatic. Mucous membranes are moist.  No drooling or stridor. NECK: Supple. Trachea midline. No nuchal rigidity. EYES: PERRL. EOM's grossly intact. HEART/CHEST: RRR. No murmurs. LUNGS: Respirations unlabored. CTAB. Good air exchange. Speaking comfortably in full sentences. ABDOMEN: No tenderness. Soft. Non-distended. No masses. No organomegaly. No guarding or rebound. MUSCULOSKELETAL: No extremity edema. Compartments soft. No deformity. No tenderness in the extremities. All extremities neurovascularly intact. SKIN: Warm and dry. Small papular rash noted scattered across the patient's back posterior neck without any vesicles, bulla or areas of necrosis. No area of fluctuance. No significant soft tissue swelling underlying  No erythema. NEUROLOGICAL: Alert and oriented. CN's 2-12 intact. No gross facial drooping. No gross focal deficits. PSYCHIATRIC: Normal mood and affect. LABS  I have reviewed all labs for this visit. No results found for this visit on 01/20/23. RADIOLOGY  None    ED COURSE/MDM  Patient presenting for evaluation of rash and appears to be most consistent with a dermatitis, possibly of allergic type given the exposure to a new scented petroleum jelly. He was advised to discontinue that. He was advised he could take Benadryl over-the-counter as needed for symptom control. I considered more clinically significant rashes such as cellulitis but did not feel that the exam was consistent with that nor any clinical evidence of Morse-Jewel's. He does not have any soft tissue swelling or fluctuance or other evidence of an abscess underlying this. Will treat with oral prednisone and have him follow-up closely as an outpatient. He does not currently have an established PCP so was given a referral and was advised to come back to the ER with any new or worsening symptoms or difficulty getting follow-up. Certainly if it persists, he may benefit from PCP referral to dermatology. However, I will start with PCP for initial follow-up especially given need for establishment of a PCP. All questions answered at time of discharge. Sepsis:  Is this patient to be included in the SEP-1 Core Measure due to severe sepsis or septic shock? No   Exclusion criteria - the patient is NOT to be included for SEP-1 Core Measure due to: Infection is not suspected        Plan of care discussed with patient. Patient in agreement with plan. Strict return precautions have been given. I, Dr. Emmanuel Samuel MD, am the primary clinician of record. During the patient's ED course, the patient was given:  Medications - No data to display     CLINICAL IMPRESSION  1. Rash and other nonspecific skin eruption        Blood pressure 127/73, pulse 71, temperature 98.3 °F (36.8 °C), temperature source Oral, resp. rate 16, height 5' 5.5\" (1.664 m), weight 153 lb (69.4 kg), SpO2 100 %. DISPOSITION  Bernardino Mcghee was discharged to home in stable condition. Patient was given scripts for the following medications. I counseled patient how to take these medications.    New Prescriptions    PREDNISONE (DELTASONE) 10 MG TABLET    Take 4 tablets daily x3 days by mouth, followed by 3 tablets daily x3 days, 2 tablets daily x3 days, and 1 tablet daily until gone       Follow-up with:  Albino Mae  [phone]  Schedule an appointment as soon as possible for a visit in 2 days  For recheck, To establish care with a primary care physician      DISCLAIMER: This chart was created using Dragon dictation software. Efforts were made by me to ensure accuracy, however some errors may be present due to limitations of this technology and occasionally words are not transcribed correctly.        Jose De Los Santos MD  01/20/23 1024

## 2023-01-20 NOTE — ED NOTES
AVS reviewed with patient. Verbalized understanding. AVS was printed and given to patient. Prescriptions sent electronically to patients pharmacy of choice.      Erinn Aguirre) Kaia Maguire RN  01/20/23 7035

## 2023-07-13 ENCOUNTER — HOSPITAL ENCOUNTER (EMERGENCY)
Age: 38
Discharge: HOME | End: 2023-07-13
Attending: EMERGENCY MEDICINE

## 2023-07-13 VITALS
WEIGHT: 150.79 LBS | RESPIRATION RATE: 16 BRPM | HEART RATE: 73 BPM | SYSTOLIC BLOOD PRESSURE: 122 MMHG | OXYGEN SATURATION: 100 % | TEMPERATURE: 98 F | BODY MASS INDEX: 24.23 KG/M2 | HEIGHT: 66 IN | DIASTOLIC BLOOD PRESSURE: 76 MMHG

## 2023-07-13 DIAGNOSIS — R21 RASH AND OTHER NONSPECIFIC SKIN ERUPTION: Primary | ICD-10-CM

## 2023-07-13 PROCEDURE — 99283 EMERGENCY DEPT VISIT LOW MDM: CPT

## 2023-07-13 ASSESSMENT — PAIN DESCRIPTION - PAIN TYPE: TYPE: ACUTE PAIN

## 2023-07-13 ASSESSMENT — PAIN - FUNCTIONAL ASSESSMENT
PAIN_FUNCTIONAL_ASSESSMENT: NONE - DENIES PAIN
PAIN_FUNCTIONAL_ASSESSMENT: 0-10

## 2023-07-13 ASSESSMENT — PAIN SCALES - GENERAL: PAINLEVEL_OUTOF10: 0

## 2023-07-13 ASSESSMENT — PAIN DESCRIPTION - DESCRIPTORS: DESCRIPTORS: ACHING

## 2023-07-13 NOTE — ED NOTES
Given ice bag to sooth skin  Aware to  Rx at [address]  to return for worsening or changes  Walked out with ease  To follow up with Steffen Larsen RN  07/13/23 3344